# Patient Record
Sex: FEMALE | Race: WHITE | NOT HISPANIC OR LATINO | Employment: FULL TIME | URBAN - METROPOLITAN AREA
[De-identification: names, ages, dates, MRNs, and addresses within clinical notes are randomized per-mention and may not be internally consistent; named-entity substitution may affect disease eponyms.]

---

## 2021-04-09 ENCOUNTER — TRANSFERRED RECORDS (OUTPATIENT)
Dept: HEALTH INFORMATION MANAGEMENT | Facility: CLINIC | Age: 61
End: 2021-04-09

## 2024-06-21 ENCOUNTER — TRANSCRIBE ORDERS (OUTPATIENT)
Dept: OTHER | Age: 64
End: 2024-06-21

## 2024-06-21 DIAGNOSIS — M25.559 CHRONIC HIP PAIN: Primary | ICD-10-CM

## 2024-06-21 DIAGNOSIS — G89.29 CHRONIC HIP PAIN: Primary | ICD-10-CM

## 2024-06-21 DIAGNOSIS — M72.2 PLANTAR FASCIITIS: ICD-10-CM

## 2024-08-06 ENCOUNTER — THERAPY VISIT (OUTPATIENT)
Dept: PHYSICAL THERAPY | Facility: CLINIC | Age: 64
End: 2024-08-06
Payer: COMMERCIAL

## 2024-08-06 DIAGNOSIS — M79.672 LEFT FOOT PAIN: Primary | ICD-10-CM

## 2024-08-06 PROCEDURE — 97110 THERAPEUTIC EXERCISES: CPT | Mod: GP | Performed by: PHYSICAL THERAPIST

## 2024-08-06 PROCEDURE — 97161 PT EVAL LOW COMPLEX 20 MIN: CPT | Mod: GP | Performed by: PHYSICAL THERAPIST

## 2024-08-06 PROCEDURE — 97035 APP MDLTY 1+ULTRASOUND EA 15: CPT | Mod: GP | Performed by: PHYSICAL THERAPIST

## 2024-08-06 PROCEDURE — 97140 MANUAL THERAPY 1/> REGIONS: CPT | Mod: GP | Performed by: PHYSICAL THERAPIST

## 2024-08-06 ASSESSMENT — ACTIVITIES OF DAILY LIVING (ADL)
HOW_WOULD_YOU_RATE_YOUR_CURRENT_LEVEL_OF_FUNCTION_DURING_YOUR_USUAL_ACTIVITIES_OF_DAILY_LIVING_FROM_0_TO_100_WITH_100_BEING_YOUR_LEVEL_OF_FUNCTION_PRIOR_TO_YOUR_HIP_PROBLEM_AND_0_BEING_THE_INABILITY_TO_PERFORM_ANY_OF_YOUR_USUAL_DAILY_ACTIVITIES?: 50
SQUATTING: A LITTLE BIT OF DIFFICULTY
LANDING: MODERATE DIFFICULTY
ABILITY_TO_PERFORM_ACTIVITY_WITH_YOUR_NORMAL_TECHNIQUE: EXTREME DIFFICULTY
SPORTS_SCORE(%): 0
WALKING_A_MILE: MODERATE DIFFICULTY
LEFS_SCORE(%): 0
SPORTS_COUNT: 9
YOUR_USUAL_HOBBIES,_RECREATIONAL_OR_SPORTING_ACTIVITIES: QUITE A BIT OF DIFFICULTY
CUTTING/LATERAL_MOVEMENTS: SLIGHT DIFFICULTY
HOS_ADL_ITEM_SCORE_TOTAL: 2
PERFORMING_LIGHT_ACTIVITIES_AROUND_YOUR_HOME: A LITTLE BIT OF DIFFICULTY
LIFTING_AN_OBJECT,_LIKE_A_BAG_OF_GROCERIES_FROM_THE_FLOOR: NO DIFFICULTY
ADL_TOTAL_ITEM_SCORE: INCOMPLETE
HOS_ADL_HIGHEST_POTENTIAL_SCORE: 4
RUNNING_ON_UNEVEN_GROUND: QUITE A BIT OF DIFFICULTY
SITTING_FOR_1_HOUR: NO DIFFICULTY
RUNNING_ON_EVEN_GROUND: QUITE A BIT OF DIFFICULTY
PLEASE_INDICATE_YOR_PRIMARY_REASON_FOR_REFERRAL_TO_THERAPY:: FOOT AND/OR ANKLE
ADL_HIGHEST_POTENTIAL_SCORE: 4
GETTING_INTO_AND_OUT_OF_A_BATH: NO DIFFICULTY
LOW_IMPACT_ACTIVITIES_LIKE_FAST_WALKING: EXTREME DIFFICULTY
HOW_WOULD_YOU_RATE_YOUR_CURRENT_LEVEL_OF_FUNCTION?: ABNORMAL
WALKING_2_BLOCKS: A LITTLE BIT OF DIFFICULTY
JUMPING: EXTREME DIFFICULTY
ADL_COUNT: 1
PERFORMING_HEAVY_ACTIVITIES_AROUND_YOUR_HOME: MODERATE DIFFICULTY
MAKING_SHARP_TURNS_WHILE_RUNNING_FAST: QUITE A BIT OF DIFFICULTY
WALKING_BETWEEN_ROOMS: A LITTLE BIT OF DIFFICULTY
ROLLING_OVER_IN_BED: NO DIFFICULTY
SWINGING_OBJECTS_LIKE_A_GOLF_CLUB: NO DIFFICULTY AT ALL
PUTTING_ON_YOUR_SHOES_OR_SOCKS: NO DIFFICULTY
STANDING_FOR_15_MINUTES: MODERATE DIFFICULTY
ADL_SCORE(%): INCOMPLETE
SPORTS_TOTAL_ITEM_SCORE: 0
STANDING_FOR_1_HOUR: EXTREME DIFFICULTY OR UNABLE TO PERFORM ACTIVITY
STARTING_AND_STOPPING_QUICKLY: SLIGHT DIFFICULTY
GOING_UP_OR_DOWN_10_STAIRS: MODERATE DIFFICULTY
ANY_OF_YOUR_USUAL_WORK,_HOUSEWORK_OR_SCHOOL_ACTIVITIES: MODERATE DIFFICULTY
PLEASE_INDICATE_YOR_PRIMARY_REASON_FOR_REFERRAL_TO_THERAPY:: HIP
RUNNING_ONE_MILE: UNABLE TO DO
GETTING_INTO_OR_OUT_OF_A_CAR: NO DIFFICULTY
STANDING FOR 15 MINUTES: MODERATE DIFFICULTY
SPORTS_HIGHEST_POTENTIAL_SCORE: 36
HOW_WOULD_YOU_RATE_YOUR_CURRENT_LEVEL_OF_FUNCTION_DURING_YOUR_USUAL_ACTIVITIES_OF_DAILY_LIVING_FROM_0_TO_100_WITH_100_BEING_YOUR_LEVEL_OF_FUNCTION_PRIOR_TO_YOUR_HIP_PROBLEM_AND_0_BEING_THE_INABILITY_TO_PERFORM_ANY_OF_YOUR_USUAL_DAILY_ACTIVITIES?: 50
SHOPPING: QUITE A BIT OF DIFFICULTY
LEFS_RAW_SCORE: 0

## 2024-08-06 NOTE — PROGRESS NOTES
PHYSICAL THERAPY EVALUATION  Type of Visit: Evaluation       Fall Risk Screen:  Fall screen completed by: PT  Have you fallen 2 or more times in the past year?: No  Have you fallen and had an injury in the past year?: No  Is patient a fall risk?: No    Subjective       Presenting condition or subjective complaint: plantar fasciitis and sciatica  Date of onset:      Relevant medical history:     Dates & types of surgery: gall bladder removal    Prior diagnostic imaging/testing results: MRI     Prior therapy history for the same diagnosis, illness or injury:        Prior Level of Function  Transfers: Independent  Ambulation: Independent  ADL: Independent  IADL:     Living Environment  Social support: Alone   Type of home: House   Stairs to enter the home:         Ramp: No   Stairs inside the home: Yes   Is there a railing: Yes     Help at home: None  Equipment owned:       Employment: Yes    Hobbies/Interests:      Patient goals for therapy: walk without pain    Pain assessment: Location: Left foot/Ratin-10/10 PL      Objective   FOOT/ANKLE EVALUATION  PAIN: Pain Level at Rest: 0/10  Pain Level with Use: 10/10  Pain Location: foot   Pain is Exacerbated By: walking; sleeping  Pain is Relieved By: rest  INTEGUMENTARY (edema, incisions):   POSTURE:   GAIT:   Weightbearing Status:   Assistive Device(s):   Gait Deviations:   BALANCE/PROPRIOCEPTION:   WEIGHT BEARING ALIGNMENT:   NON-WEIGHTBEARING ALIGNMENT:    ROM:   (Degrees) Left AROM Left PROM  Right AROM Right PROM   Ankle Dorsiflexion 5  12    Ankle Plantarflexion WNL  WNL    Ankle Inversion WNL  WNL    Ankle Eversion WNL  WNL    Great Toe Flexion       Great Toe Extension       Pain:   End feel:     STRENGTH:   Pain: - none + mild ++ moderate +++ severe  Strength Scale: 0-5/5 Left Right   Ankle Dorsiflexion 4 5   Ankle Plantarflexion 4 5   Ankle Inversion 4 5   Ankle Eversion 4 5   Great Toe Flexion     Great Toe Extension     Anterior Tibialis     Posterior  Tibialis     Peroneals     Extensor Digitorum     Gastroc/Soleus       FLEXIBILITY:   SPECIAL TESTS:   FUNCTIONAL TESTS:   PALPATION:   + Tenderness at Location Left Right   Gastroc/Soleus     Achilles Tendon     Anterior Tibialis     Posterior Tibialis     Incisional     Deltoid Ligament     Plantar Fascia + -   Navicular     Medial Calcaneal     Peroneals     Anterior Talofibular Ligament     Posterior Talofibular Ligament     Calcaneofibular Ligament     Medial Malleolus     Lateral Malleolus     Anterior Tibiofibular Ligament     Posterior Tibiofibular Ligament       JOINT MOBILITY:     Assessment & Plan   CLINICAL IMPRESSIONS  Medical Diagnosis: Left foot    Treatment Diagnosis:     Impression/Assessment: Patient is a 64 year old female with left foot complaints.  The following significant findings have been identified: Pain, Decreased ROM/flexibility, Decreased strength, Impaired muscle performance, and Decreased activity tolerance. These impairments interfere with their ability to perform self care tasks, recreational activities, household chores, household mobility, and community mobility as compared to previous level of function.     Clinical Decision Making (Complexity):  Clinical Presentation: Stable/Uncomplicated  Clinical Presentation Rationale: based on medical and personal factors listed in PT evaluation  Clinical Decision Making (Complexity): Low complexity    PLAN OF CARE  Treatment Interventions:  Modalities: Ultrasound  Interventions: Manual Therapy, Neuromuscular Re-education, Therapeutic Activity, Therapeutic Exercise    Long Term Goals     PT Goal 1  Goal Identifier: Ambulation  Goal Description: Pain free first few step in the morning  Rationale: to maximize safety and independence with performance of ADLs and functional tasks  Goal Progress: 10/10 PL with first few steps in mornins  Target Date: 10/01/24      Frequency of Treatment: 1x/week  Duration of Treatment: 8 weeks    Recommended  Referrals to Other Professionals:   Education Assessment:        Risks and benefits of evaluation/treatment have been explained.   Patient/Family/caregiver agrees with Plan of Care.     Evaluation Time:     PT Sophie, Low Complexity Minutes (32287): 15       Signing Clinician: Fiordaliza Calzada PT

## 2024-08-13 ENCOUNTER — THERAPY VISIT (OUTPATIENT)
Dept: PHYSICAL THERAPY | Facility: CLINIC | Age: 64
End: 2024-08-13
Payer: COMMERCIAL

## 2024-08-13 DIAGNOSIS — M79.672 LEFT FOOT PAIN: Primary | ICD-10-CM

## 2024-08-13 PROCEDURE — 97140 MANUAL THERAPY 1/> REGIONS: CPT | Mod: GP | Performed by: PHYSICAL THERAPIST

## 2024-08-13 PROCEDURE — 97035 APP MDLTY 1+ULTRASOUND EA 15: CPT | Mod: GP | Performed by: PHYSICAL THERAPIST

## 2024-08-13 PROCEDURE — 97110 THERAPEUTIC EXERCISES: CPT | Mod: GP | Performed by: PHYSICAL THERAPIST

## 2024-08-20 ENCOUNTER — THERAPY VISIT (OUTPATIENT)
Dept: PHYSICAL THERAPY | Facility: CLINIC | Age: 64
End: 2024-08-20
Payer: COMMERCIAL

## 2024-08-20 DIAGNOSIS — M25.551 HIP PAIN, RIGHT: Primary | ICD-10-CM

## 2024-08-20 PROCEDURE — 97140 MANUAL THERAPY 1/> REGIONS: CPT | Mod: GP | Performed by: PHYSICAL THERAPIST

## 2024-08-20 PROCEDURE — 97110 THERAPEUTIC EXERCISES: CPT | Mod: GP | Performed by: PHYSICAL THERAPIST

## 2024-08-20 PROCEDURE — 97161 PT EVAL LOW COMPLEX 20 MIN: CPT | Mod: GP | Performed by: PHYSICAL THERAPIST

## 2024-08-20 ASSESSMENT — ACTIVITIES OF DAILY LIVING (ADL)
ABILITY_TO_PARTICIPATE_IN_YOUR_DESIRED_SPORT_AS_LONG_AS_YOU_WOULD_LIKE: EXTREME DIFFICULTY
HEAVY_WORK: MODERATE DIFFICULTY
GETTING INTO AND OUT OF AN AVERAGE CAR: NO DIFFICULTY AT ALL
RUNNING_ON_EVEN_GROUND: QUITE A BIT OF DIFFICULTY
ADL_SCORE(%): 0
HOS_ADL_SCORE(%): 56.25
PUTTING ON SOCKS AND SHOES: SLIGHT DIFFICULTY
WALKING_15_MINUTES_OR_GREATER: EXTREME DIFFICULTY
PLEASE_INDICATE_YOR_PRIMARY_REASON_FOR_REFERRAL_TO_THERAPY:: HIP
LOW_IMPACT_ACTIVITIES_LIKE_FAST_WALKING: EXTREME DIFFICULTY
MAKING_SHARP_TURNS_WHILE_RUNNING_FAST: QUITE A BIT OF DIFFICULTY
WALKING_INITIALLY: SLIGHT DIFFICULTY
ANY_OF_YOUR_USUAL_WORK,_HOUSEWORK_OR_SCHOOL_ACTIVITIES: QUITE A BIT OF DIFFICULTY
ROLLING_OVER_IN_BED: NO DIFFICULTY AT ALL
HOW_WOULD_YOU_RATE_YOUR_CURRENT_LEVEL_OF_FUNCTION_DURING_YOUR_USUAL_ACTIVITIES_OF_DAILY_LIVING_FROM_0_TO_100_WITH_100_BEING_YOUR_LEVEL_OF_FUNCTION_PRIOR_TO_YOUR_HIP_PROBLEM_AND_0_BEING_THE_INABILITY_TO_PERFORM_ANY_OF_YOUR_USUAL_DAILY_ACTIVITIES?: 30
RECREATIONAL_ACTIVITIES: MODERATE DIFFICULTY
ABILITY_TO_PERFORM_ACTIVITY_WITH_YOUR_NORMAL_TECHNIQUE: EXTREME DIFFICULTY
GOING_UP_1_FLIGHT_OF_STAIRS: SLIGHT DIFFICULTY
SHOPPING: EXTREME DIFFICULTY OR UNABLE TO PERFORM ACTIVITY
STANDING_FOR_15_MINUTES: EXTREME DIFFICULTY
GOING_UP_OR_DOWN_10_STAIRS: QUITE A BIT OF DIFFICULTY
CUTTING/LATERAL_MOVEMENTS: MODERATE DIFFICULTY
LIFTING_AN_OBJECT,_LIKE_A_BAG_OF_GROCERIES_FROM_THE_FLOOR: A LITTLE BIT OF DIFFICULTY
HEAVY_WORK: MODERATE DIFFICULTY
SQUATTING: QUITE A BIT OF DIFFICULTY
HOW_WOULD_YOU_RATE_YOUR_CURRENT_LEVEL_OF_FUNCTION_DURING_YOUR_USUAL_ACTIVITIES_OF_DAILY_LIVING_FROM_0_TO_100_WITH_100_BEING_YOUR_LEVEL_OF_FUNCTION_PRIOR_TO_YOUR_HIP_PROBLEM_AND_0_BEING_THE_INABILITY_TO_PERFORM_ANY_OF_YOUR_USUAL_DAILY_ACTIVITIES?: 30
RUNNING_ON_UNEVEN_GROUND: QUITE A BIT OF DIFFICULTY
WALKING_BETWEEN_ROOMS: A LITTLE BIT OF DIFFICULTY
YOUR_USUAL_HOBBIES,_RECREATIONAL_OR_SPORTING_ACTIVITIES: QUITE A BIT OF DIFFICULTY
LEFS_RAW_SCORE: INCOMPLETE
PUTTING_ON_YOUR_SHOES_OR_SOCKS: A LITTLE BIT OF DIFFICULTY
WALKING_UP_STEEP_HILLS: EXTREME DIFFICULTY
ADL_TOTAL_ITEM_SCORE: 0
WALKING_APPROXIMATELY_10_MINUTES: MODERATE DIFFICULTY
SPORTS_TOTAL_ITEM_SCORE: 0
HOW_WOULD_YOU_RATE_YOUR_CURRENT_LEVEL_OF_FUNCTION?: ABNORMAL
LEFS_SCORE(%): INCOMPLETE
RUNNING_ONE_MILE: UNABLE TO DO
JUMPING: EXTREME DIFFICULTY
RECREATIONAL ACTIVITIES: MODERATE DIFFICULTY
SITTING_FOR_15_MINUTES: NO DIFFICULTY AT ALL
WALKING_DOWN_STEEP_HILLS: MODERATE DIFFICULTY
SPORTS_HIGHEST_POTENTIAL_SCORE: 36
LIGHT_TO_MODERATE_WORK: EXTREME DIFFICULTY
WALKING_2_BLOCKS: MODERATE DIFFICULTY
HOS_ADL_HIGHEST_POTENTIAL_SCORE: 64
ADL_COUNT: 17
PUTTING_ON_SOCKS_AND_SHOES: SLIGHT DIFFICULTY
ADL_HIGHEST_POTENTIAL_SCORE: 68
STEPPING UP AND DOWN CURBS: SLIGHT DIFFICULTY
STANDING FOR 15 MINUTES: EXTREME DIFFICULTY
STARTING_AND_STOPPING_QUICKLY: MODERATE DIFFICULTY
SPORTS_COUNT: 9
SWINGING_OBJECTS_LIKE_A_GOLF_CLUB: SLIGHT DIFFICULTY
DEEP SQUATTING: EXTREME DIFFICULTY
GOING_DOWN_1_FLIGHT_OF_STAIRS: NO DIFFICULTY AT ALL
GETTING_INTO_AND_OUT_OF_AN_AVERAGE_CAR: NO DIFFICULTY AT ALL
TWISTING/PIVOTING_ON_INVOLVED_LEG: MODERATE DIFFICULTY
WALKING_15_MINUTES_OR_GREATER: EXTREME DIFFICULTY
ROLLING_OVER_IN_BED: NO DIFFICULTY
WALKING_UP_STEEP_HILLS: EXTREME DIFFICULTY
SITTING FOR 15 MINUTES: NO DIFFICULTY AT ALL
GETTING_INTO_OR_OUT_OF_A_CAR: A LITTLE BIT OF DIFFICULTY
STEPPING_UP_AND_DOWN_CURBS: SLIGHT DIFFICULTY
WALKING_DOWN_STEEP_HILLS: MODERATE DIFFICULTY
WALKING_FOR_APPROXIMATELY_10_MINUTES: MODERATE DIFFICULTY
WALKING_INITIALLY: SLIGHT DIFFICULTY
GOING DOWN 1 FLIGHT OF STAIRS: NO DIFFICULTY AT ALL
LIGHT_TO_MODERATE_WORK: EXTREME DIFFICULTY
STANDING_FOR_1_HOUR: EXTREME DIFFICULTY OR UNABLE TO PERFORM ACTIVITY
PLEASE_INDICATE_YOR_PRIMARY_REASON_FOR_REFERRAL_TO_THERAPY:: FOOT AND/OR ANKLE
HOS_ADL_ITEM_SCORE_TOTAL: 36
DEEP_SQUATTING: EXTREME DIFFICULTY
LANDING: EXTREME DIFFICULTY
ROLLING OVER IN BED: NO DIFFICULTY AT ALL
TWISTING/PIVOTING ON INVOLVED LEG: MODERATE DIFFICULTY
SITTING_FOR_1_HOUR: NO DIFFICULTY
PERFORMING_HEAVY_ACTIVITIES_AROUND_YOUR_HOME: QUITE A BIT OF DIFFICULTY
GOING UP 1 FLIGHT OF STAIRS: SLIGHT DIFFICULTY
PERFORMING_LIGHT_ACTIVITIES_AROUND_YOUR_HOME: A LITTLE BIT OF DIFFICULTY
WALKING_A_MILE: QUITE A BIT OF DIFFICULTY
SPORTS_SCORE(%): 0

## 2024-08-20 NOTE — PROGRESS NOTES
PHYSICAL THERAPY EVALUATION  Type of Visit: Evaluation        Fall Risk Screen:  Fall screen completed by: PT  Have you fallen 2 or more times in the past year?: No  Have you fallen and had an injury in the past year?: No  Is patient a fall risk?: No    Subjective       Presenting condition or subjective complaint: plantar fasciitis and sciatica  Date of onset:      Relevant medical history:     Dates & types of surgery: gall bladder removal    Prior diagnostic imaging/testing results: MRI     Prior therapy history for the same diagnosis, illness or injury:        Prior Level of Function  Transfers: Independent  Ambulation: Independent  ADL: Independent  IADL:     Living Environment  Social support: Alone   Type of home: House   Stairs to enter the home:         Ramp: No   Stairs inside the home: Yes   Is there a railing: Yes     Help at home: None  Equipment owned:       Employment: Yes    Hobbies/Interests:      Patient goals for therapy: walk without pain    Pain assessment: Pain present  Location: Right hip; groin/lateral hip/right glute/Ratin-10/10 PL     Objective   HIP EVALUATION  PAIN: Pain Level at Rest: 2/10  Pain Level with Use: 10/10  Pain is Exacerbated By: Walking  Pain is Relieved By: NSAIDs, rest, and sitting  INTEGUMENTARY (edema, incisions):   POSTURE:   GAIT:   Weightbearing Status: WBAT  Assistive Device(s): None  Gait Deviations: Base of support decreased  Stance time decreased  Stride length decreased  BALANCE/PROPRIOCEPTION:   WEIGHTBEARING ALIGNMENT:   NON-WEIGHTBEARING ALIGNMENT:    ROM:   (Degrees) Left AROM Left PROM  Right AROM Right PROM   Hip Flexion  110  90 with pain at end-range   Hip Extension       Hip Abduction  45  30   Hip Adduction       Hip Internal Rotation  20  -10   Hip External Rotation  40  20   Knee Flexion       Knee Extension       Lumbar Side glide     Lumbar Flexion    Lumbar Extension    Pain:   End feel:     PELVIC/SI SCREEN:   STRENGTH:   Pain: - none + mild  ++ moderate +++ severe  Strength Scale: 0-5/5 Left Right   Hip Flexion     Hip Extension 5 4-   Hip Abduction 5 4-   Hip Adduction     Hip Internal Rotation     Hip External Rotation     Knee Flexion     Knee Extension       LE FLEXIBILITY:   SPECIAL TESTS:   FUNCTIONAL TESTS:   PALPATION:   JOINT MOBILITY:     Assessment & Plan   CLINICAL IMPRESSIONS  Medical Diagnosis: R hip    Treatment Diagnosis:     Impression/Assessment: Patient is a 64 year old female with Right hip complaints.  The following significant findings have been identified: Pain, Decreased ROM/flexibility, Decreased joint mobility, Decreased strength, Impaired gait, and Impaired muscle performance. These impairments interfere with their ability to perform recreational activities, household chores, household mobility, and community mobility as compared to previous level of function.     Clinical Decision Making (Complexity):  Clinical Presentation: Stable/Uncomplicated  Clinical Presentation Rationale: based on medical and personal factors listed in PT evaluation  Clinical Decision Making (Complexity): Low complexity    PLAN OF CARE  Treatment Interventions:  Interventions: Manual Therapy, Neuromuscular Re-education, Therapeutic Activity, Therapeutic Exercise    Long Term Goals     PT Goal 1  Goal Identifier: Ambulation  Goal Description: Able to walk 20 min pain free  Rationale: to maximize safety and independence with performance of ADLs and functional tasks  Goal Progress: Able to walk 20' 2-10/10 PL  Target Date: 10/15/24      Frequency of Treatment: 1x/week  Duration of Treatment: 8 weeks    Recommended Referrals to Other Professionals:   Education Assessment:        Risks and benefits of evaluation/treatment have been explained.   Patient/Family/caregiver agrees with Plan of Care.     Evaluation Time:     PT Eval, Low Complexity Minutes (30934): 15       Signing Clinician: Fiordaliza Calzada PT

## 2024-08-27 ENCOUNTER — THERAPY VISIT (OUTPATIENT)
Dept: PHYSICAL THERAPY | Facility: CLINIC | Age: 64
End: 2024-08-27
Payer: COMMERCIAL

## 2024-08-27 DIAGNOSIS — M79.672 LEFT FOOT PAIN: Primary | ICD-10-CM

## 2024-08-27 PROCEDURE — 97140 MANUAL THERAPY 1/> REGIONS: CPT | Mod: GP | Performed by: PHYSICAL THERAPY ASSISTANT

## 2024-08-27 PROCEDURE — 97035 APP MDLTY 1+ULTRASOUND EA 15: CPT | Mod: GP | Performed by: PHYSICAL THERAPY ASSISTANT

## 2024-08-27 PROCEDURE — 97110 THERAPEUTIC EXERCISES: CPT | Mod: GP | Performed by: PHYSICAL THERAPY ASSISTANT

## 2024-08-29 DIAGNOSIS — M25.551 RIGHT HIP PAIN: Primary | ICD-10-CM

## 2024-08-29 NOTE — PROGRESS NOTES
[facility]  SPORTS MEDICINE CLINIC VISIT     Sep 4, 2024        ASSESSMENT & PLAN    64-year-old with right hip pain in the groin and lateral hip that is suspected to be due to advanced osteoarthritis in the femoral acetabular joint    Reviewed imaging and assessment with patient in detail  We discussed option for treatment which could include injection for diagnostic and therapeutic purposes as well as referral to surgeon for consultation regarding arthroplasty.  After discussion the patient would like to pursue injection first.  She will be assisted with scheduling.  Okay to continue with physical therapy at her discretion    Richard Burk MD  [facility] SPORTS MEDICINE [facility]    -----  Chief Complaint   Patient presents with    Consult For     Right hip pain       SUBJECTIVE  Jaky Brooke is a/an 64 year old female who is seen as a self referral for evaluation of  Right hip pain.     The patient is seen by themselves.  The patient is Right handed    Onset: many years(s) ago. Reports insidious onset without acute precipitating event. Possible over streching in yoga  Location of Pain: right hip - lateral, groin, and posterior  Worsened by: walking, standing  Better with: ibuprofen  Treatments tried:  PT (4 visits), chiropractic, ibuprofen  Associated symptoms: feeling of instability, pain going down the leg, denies numbness or tingling     Orthopedic/Surgical history: NO  Social History/Occupation: Sit at a desk      REVIEW OF SYSTEMS:  Do you have fever, chills, weight loss? No  Do you have any vision problems? No  Do you have any chest pain or edema? No  Do you have any shortness of breath or wheezing?  No  Do you have stomach problems? No  Do you have any numbness or focal weakness? No  Do you have diabetes? No  Do you have problems with bleeding or clotting? No  Do you have an rashes or other skin lesions? No    OBJECTIVE:  There were no vitals taken for this visit.      Exam:  Patient is alert, in no acute distress, pleasant and conversational.    Gait: Nonantalgic.  Normal heel toe gait      right Hip:  Supine PROM:  Flexion: Approximately 115 , no tenderness.  External rotation: approximately 60 , no tenderness.  Internal rotation: Approximately 0 , with pain    Strength Testing:  Not tested    Palpation:  negative tenderness to palpation over the greater trochanter.  negative tenderness to palpation over psoas  negative tenderness to palpation over ASIS  negative tenderness to palpation over Iliac crest  negative tenderness to palpation along the piriformis.  negative tenderness to palpation of the SI joint    Special Tests:  negative Tyrone's test.   negative KALYAN's test  positive  FADIR's test  positive  Scour test  negative Reported pain with resisted hip flexion to opposite shoulder     Neurovascularly intact in bilateral lower extremities      RADIOLOGY:    2 view xrays of right hip performed and reviewed independently demonstrating moderate to severe osteoarthritis.  No acute findings. See EMR for formal radiology report.

## 2024-09-03 NOTE — TELEPHONE ENCOUNTER
REASON FOR VISIT: right hip    DATE OF APPT: 9/04/2024   NOTES (FOR ALL VISITS) STATUS DETAILS   OFFICE NOTE from referring provider N/A    OFFICE NOTE from other specialist Internal [facility]  Fiordaliza Calzada, PT 8/20/2024   MEDICATION LIST N/A    IMAGING  (FOR ALL VISITS)     XR Scheduled [facility]  XR Pelvis and hip 9/04/2024   MRI (HEAD, NECK, SPINE) N/A    CT (HEAD, NECK, SPINE) N/A

## 2024-09-04 ENCOUNTER — PRE VISIT (OUTPATIENT)
Dept: ORTHOPEDICS | Facility: CLINIC | Age: 64
End: 2024-09-04

## 2024-09-04 ENCOUNTER — OFFICE VISIT (OUTPATIENT)
Dept: ORTHOPEDICS | Facility: CLINIC | Age: 64
End: 2024-09-04
Payer: COMMERCIAL

## 2024-09-04 ENCOUNTER — THERAPY VISIT (OUTPATIENT)
Dept: PHYSICAL THERAPY | Facility: CLINIC | Age: 64
End: 2024-09-04
Payer: COMMERCIAL

## 2024-09-04 ENCOUNTER — ANCILLARY PROCEDURE (OUTPATIENT)
Dept: GENERAL RADIOLOGY | Facility: CLINIC | Age: 64
End: 2024-09-04
Attending: FAMILY MEDICINE
Payer: COMMERCIAL

## 2024-09-04 DIAGNOSIS — M25.551 HIP PAIN, RIGHT: Primary | ICD-10-CM

## 2024-09-04 DIAGNOSIS — M25.551 RIGHT HIP PAIN: ICD-10-CM

## 2024-09-04 DIAGNOSIS — M16.11 PRIMARY OSTEOARTHRITIS OF RIGHT HIP: Primary | ICD-10-CM

## 2024-09-04 PROCEDURE — 97110 THERAPEUTIC EXERCISES: CPT | Mod: GP | Performed by: PHYSICAL THERAPY ASSISTANT

## 2024-09-04 PROCEDURE — 73502 X-RAY EXAM HIP UNI 2-3 VIEWS: CPT | Mod: RT | Performed by: RADIOLOGY

## 2024-09-04 PROCEDURE — 99204 OFFICE O/P NEW MOD 45 MIN: CPT | Performed by: FAMILY MEDICINE

## 2024-09-04 PROCEDURE — 97140 MANUAL THERAPY 1/> REGIONS: CPT | Mod: GP | Performed by: PHYSICAL THERAPY ASSISTANT

## 2024-09-04 NOTE — LETTER
9/4/2024      Jaky Brooke  [address]  [place] [address]      Dear Colleague,    Thank you for referring your patient, Jaky Brooke, to the [facility]. Please see a copy of my visit note below.      [facility]  SPORTS MEDICINE CLINIC VISIT     Sep 4, 2024        ASSESSMENT & PLAN    64-year-old with right hip pain in the groin and lateral hip that is suspected to be due to advanced osteoarthritis in the femoral acetabular joint    Reviewed imaging and assessment with patient in detail  We discussed option for treatment which could include injection for diagnostic and therapeutic purposes as well as referral to surgeon for consultation regarding arthroplasty.  After discussion the patient would like to pursue injection first.  She will be assisted with scheduling.  Okay to continue with physical therapy at her discretion    Richard Burk MD  [facility]    -----  Chief Complaint   Patient presents with     Consult For     Right hip pain       SUBJECTIVE  Jaky Brooke is a/an 64 year old female who is seen as a self referral for evaluation of  Right hip pain.     The patient is seen by themselves.  The patient is Right handed    Onset: many years(s) ago. Reports insidious onset without acute precipitating event. Possible over streching in yoga  Location of Pain: right hip - lateral, groin, and posterior  Worsened by: walking, standing  Better with: ibuprofen  Treatments tried:  PT (4 visits), chiropractic, ibuprofen  Associated symptoms: feeling of instability, pain going down the leg, denies numbness or tingling     Orthopedic/Surgical history: NO  Social History/Occupation: Sit at a desk      REVIEW OF SYSTEMS:  Do you have fever, chills, weight loss? No  Do you have any vision problems? No  Do you have any chest pain or edema? No  Do you have any shortness of breath or wheezing?  No  Do you have  stomach problems? No  Do you have any numbness or focal weakness? No  Do you have diabetes? No  Do you have problems with bleeding or clotting? No  Do you have an rashes or other skin lesions? No    OBJECTIVE:  There were no vitals taken for this visit.     Exam:  Patient is alert, in no acute distress, pleasant and conversational.    Gait: Nonantalgic.  Normal heel toe gait      right Hip:  Supine PROM:  Flexion: Approximately 115 , no tenderness.  External rotation: approximately 60 , no tenderness.  Internal rotation: Approximately 0 , with pain    Strength Testing:  Not tested    Palpation:  negative tenderness to palpation over the greater trochanter.  negative tenderness to palpation over psoas  negative tenderness to palpation over ASIS  negative tenderness to palpation over Iliac crest  negative tenderness to palpation along the piriformis.  negative tenderness to palpation of the SI joint    Special Tests:  negative Tyrone's test.   negative KALYAN's test  positive  FADIR's test  positive  Scour test  negative Reported pain with resisted hip flexion to opposite shoulder     Neurovascularly intact in bilateral lower extremities      RADIOLOGY:    2 view xrays of right hip performed and reviewed independently demonstrating moderate to severe osteoarthritis.  No acute findings. See EMR for formal radiology report.           Again, thank you for allowing me to participate in the care of your patient.        Sincerely,        Richard Burk MD

## 2024-09-14 ENCOUNTER — OFFICE VISIT (OUTPATIENT)
Dept: ORTHOPEDICS | Facility: CLINIC | Age: 64
End: 2024-09-14
Payer: COMMERCIAL

## 2024-09-14 DIAGNOSIS — M25.551 RIGHT HIP PAIN: Primary | ICD-10-CM

## 2024-09-14 PROCEDURE — 20611 DRAIN/INJ JOINT/BURSA W/US: CPT | Mod: RT | Performed by: FAMILY MEDICINE

## 2024-09-14 RX ORDER — TRIAMCINOLONE ACETONIDE 40 MG/ML
40 INJECTION, SUSPENSION INTRA-ARTICULAR; INTRAMUSCULAR
Status: SHIPPED | OUTPATIENT
Start: 2024-09-14

## 2024-09-14 RX ADMIN — TRIAMCINOLONE ACETONIDE 40 MG: 40 INJECTION, SUSPENSION INTRA-ARTICULAR; INTRAMUSCULAR at 11:16

## 2024-09-14 NOTE — PROGRESS NOTES
Ultrasound-guided right hip injection    Date/Time: 9/14/2024 11:16 AM    Performed by: Richard Burk MD  Authorized by: Richard Burk MD    Indications:  Osteoarthritis and pain  Needle Size:  22 G  Guidance: ultrasound    Approach:  Anterior  Location:  Hip      Site:  R hip joint  Medications:  40 mg triamcinolone 40 MG/ML  Outcome:  Tolerated well, no immediate complications  Procedure discussed: discussed risks, benefits, and alternatives    Consent Given by:  Patient  Timeout: timeout called immediately prior to procedure    Prep: patient was prepped and draped in usual sterile fashion     Patient was positioned lying on exam table. Utilizing ultrasound, the femoral head neck junction was visualized and marked with a pen. Ultrasound was utilized to ensure safety of injection clear of neurovascular structures. Next the area was cleaned with a chlorhexadine swab. Using sterile technique, and continuous ultrasound visualization, 3mL 1% lidocaine was injected through a 25g needle along the injection tract. Next, a 22g spinal needle was introduced into the joint at the level of the femoral head/neck junction under direct and continuous ultrasound guidance. A solution of 2mL 1% lidocaine and 40mg triamcinolone was injected and seen flowing through the joint. Images were captured and saved to the patient's permanent record. Patient tolerated the procedure well. No immediate complications. Routine postinjection instructions were given. Follow up promptly if significant increase in pain, warmth, redness from the injection site.     Richard Burk MD

## 2024-09-14 NOTE — LETTER
9/14/2024      Jaky Brooke  [address]  [place] [address]      Dear Colleague,    Thank you for referring your patient, Jaky Brooke, to the [facility] SPORTS MEDICINE CLINIC [place]. Please see a copy of my visit note below.    Ultrasound-guided right hip injection    Date/Time: 9/14/2024 11:16 AM    Performed by: Richard Burk MD  Authorized by: Richard Burk MD    Indications:  Osteoarthritis and pain  Needle Size:  22 G  Guidance: ultrasound    Approach:  Anterior  Location:  Hip      Site:  R hip joint  Medications:  40 mg triamcinolone 40 MG/ML  Outcome:  Tolerated well, no immediate complications  Procedure discussed: discussed risks, benefits, and alternatives    Consent Given by:  Patient  Timeout: timeout called immediately prior to procedure    Prep: patient was prepped and draped in usual sterile fashion     Patient was positioned lying on exam table. Utilizing ultrasound, the femoral head neck junction was visualized and marked with a pen. Ultrasound was utilized to ensure safety of injection clear of neurovascular structures. Next the area was cleaned with a chlorhexadine swab. Using sterile technique, and continuous ultrasound visualization, 3mL 1% lidocaine was injected through a 25g needle along the injection tract. Next, a 22g spinal needle was introduced into the joint at the level of the femoral head/neck junction under direct and continuous ultrasound guidance. A solution of 2mL 1% lidocaine and 40mg triamcinolone was injected and seen flowing through the joint. Images were captured and saved to the patient's permanent record. Patient tolerated the procedure well. No immediate complications. Routine postinjection instructions were given. Follow up promptly if significant increase in pain, warmth, redness from the injection site.     Richard Burk MD               Again, thank you for allowing me to participate in the care of your patient.         Sincerely,        Richard Burk MD

## 2024-09-14 NOTE — NURSING NOTE
[facility]   ORTHOPEDICS & SPORTS MEDICINE  [address] [place] [address]  Dept: [phone]  ______________________________________________________________________________    Patient: Jaky Brooke   : 1960   MRN: [id number]   2024    INVASIVE PROCEDURE SAFETY CHECKLIST    Date: 24   Procedure:Right Hip Injection  Patient Name: Jaky Brooke  MRN: [id number]  YOB: 1960    Action: Complete sections as appropriate. Any discrepancy results in a HARD COPY until resolved.     PRE PROCEDURE:  Patient ID verified with 2 identifiers (name and  or MRN): Yes  Procedure and site verified with patient/designee (when able): Yes  Accurate consent documentation in medical record: Yes  H&P (or appropriate assessment) documented in medical record: NA  H&P must be up to 20 days prior to procedure and updates within 24 hours of procedure as applicable: NA  Relevant diagnostic and radiology test results appropriately labeled and displayed as applicable: Yes  Procedure site(s) marked with provider initials: Yes    TIMEOUT:  Time-Out performed immediately prior to starting procedure, including verbal and active participation of all team members addressing the following:Yes  * Correct patient identify  * Confirmed that the correct side and site are marked  * An accurate procedure consent form  * Agreement on the procedure to be done  * Correct patient position  * Relevant images and results are properly labeled and appropriately displayed  * The need to administer antibiotics or fluids for irrigation purposes during the procedure as applicable   * Safety precautions based on patient history or medication use    DURING PROCEDURE: Verification of correct person, site, and procedures any time the responsibility for care of the patient is transferred to another member of the care team.       Prior to injection, verified patient identity using patient's name and date of  birth.  Due to injection administration, patient instructed to remain in clinic for 15 minutes  afterwards, and to report any adverse reaction to me immediately.    Right Hip Injection    Drug Amount Wasted:  None.  Vial/Syringe: Single dose vial  Expiration Date:  05/31/2026      Ailyn Zheng LPN  September 14, 2024

## 2024-09-17 ENCOUNTER — THERAPY VISIT (OUTPATIENT)
Dept: PHYSICAL THERAPY | Facility: CLINIC | Age: 64
End: 2024-09-17
Payer: COMMERCIAL

## 2024-09-17 DIAGNOSIS — M25.551 HIP PAIN, RIGHT: Primary | ICD-10-CM

## 2024-09-17 DIAGNOSIS — M79.672 LEFT FOOT PAIN: ICD-10-CM

## 2024-09-17 PROCEDURE — 97110 THERAPEUTIC EXERCISES: CPT | Mod: GP | Performed by: PHYSICAL THERAPIST

## 2024-09-18 NOTE — PROGRESS NOTES
09/17/24 0500   Appointment Info   Signing clinician's name / credentials Fiordaliza Calzada DPT   Total/Authorized Visits 8   Visits Used 2   Medical Diagnosis R hip   PT Tx Diagnosis Left foot   Progress Note/Certification   Therapy Frequency 1x/week   Predicted Duration 8 weeks   PT Goal 1   Goal Identifier Ambulation   Goal Description Able to walk 20 min pain free   Rationale to maximize safety and independence with performance of ADLs and functional tasks   Goal Progress Able to walk 20' 2-10/10 PL   Target Date 10/15/24   Subjective Report   Subjective Report Patient reports the injection has helped greatly with her pain.  Due to her imaging results plans to have a ortho consult to discuss surgery.  We discussed to continue with a prehab HEP 3x/week.   Objective Measures   Objective Measures Objective Measure 1;Objective Measure 2   Objective Measure 1   Objective Measure R Hip PROM   Details Flex 90 + Abd 30+ IR -10+ ER 20 +   Objective Measure 2   Objective Measure PROM   Details DF 10   PT Modalities   PT Modalities Ultrasound   Ultrasound   Ultrasound -Type (does not include 3-5 min prep/cleanup time) Continuous   Intensity 1.8   Duration (does not include the 3-5 min set up/clean up time) 8 min   Frequency 1 MHz   Location left arch/heel   Positioning prone   Patient Response/Progress tolerated well   Treatment Interventions (PT)   Interventions Therapeutic Procedure/Exercise;Manual Therapy   Therapeutic Procedure/Exercise   Therapeutic Procedures: strength, endurance, ROM, flexibility minutes (11694) 30   PTRx Ther Proc 1 Prone Positioning   PTRx Ther Proc 1 - Details VR; has worked to LATHA    PTRx Ther Proc 2 All 4s Cat Cow   PTRx Ther Proc 2 - Details No Notes   PTRx Ther Proc 3 Repeated Hip Extension in Neutral/IR/ER   PTRx Ther Proc 3 - Details No Notes   PTRx Ther Proc 4 Standing Hip Abduction   PTRx Ther Proc 4 - Details 5x Red loop   PTRx Ther Proc 5 Hip AROM Standing Extension   PTRx Ther Proc 5  - Details x10 B red loop   PTRx Ther Proc 6 Hip Flexion With Tubing   PTRx Ther Proc 6 - Details 5x red loop   PTRx Ther Proc 7 Bridging #1   PTRx Ther Proc 7 - Details x10    Skilled Intervention progressed to hip strength to support hip and improve ambulation   Patient Response/Progress pain with clamshell but no worse after;   PTRx Ther Proc 8 Clamshell with Theraband   PTRx Ther Proc 8 - Details 10x RTB   PTRx Ther Proc 9 Knee Bends   PTRx Ther Proc 9 - Details 10x   PTRx Ther Proc 10 Seated Hamstring Curl with Tubing   PTRx Ther Proc 10 - Details 10x RTB   Manual Therapy   Manual Therapy 1 Efflaurage with mobilization   Manual Therapy 1 - Details Anterior/posterior hip (supine/prone) 2 sets 10 reps Post Grade II mobs hip/knee 90/90   Skilled Intervention for ROM and pain control   Patient Response/Progress increased motion; decreased pain   Plan   Home program PTRX print   Updates to plan of care added strength   Plan for next session await recommendations from MD; progress strength as tolerated   Total Session Time   Timed Code Treatment Minutes 30   Total Treatment Time (sum of timed and untimed services) 30         DISCHARGE  Reason for Discharge: Improved with injection    Equipment Issued:     Discharge Plan: Patient to continue home program.    Referring Provider:  Robby Nava

## 2024-09-27 ENCOUNTER — PATIENT OUTREACH (OUTPATIENT)
Dept: CARE COORDINATION | Facility: CLINIC | Age: 64
End: 2024-09-27
Payer: COMMERCIAL

## 2024-09-27 NOTE — TELEPHONE ENCOUNTER
REASON FOR VISIT: Primary osteoarthritis of right hip   DATE OF APPT: 10/15/2024   NOTES (FOR ALL VISITS) STATUS DETAILS   OFFICE NOTE from referring provider [facility] Richard Hernandez MD 9/26/2024   OFFICE NOTE from other specialist [facility]  Josee Marie, JACOBO 9/04/2024   MEDICATION LIST N/A    IMAGING  (FOR ALL VISITS)     XR [facility]  XR Pelvis/hip right 9/04/2024   MRI (HEAD, NECK, SPINE) N/A    CT (HEAD, NECK, SPINE) N/A        
no

## 2024-09-30 ENCOUNTER — PATIENT OUTREACH (OUTPATIENT)
Dept: CARE COORDINATION | Facility: CLINIC | Age: 64
End: 2024-09-30
Payer: COMMERCIAL

## 2024-10-15 ENCOUNTER — OFFICE VISIT (OUTPATIENT)
Dept: ORTHOPEDICS | Facility: CLINIC | Age: 64
End: 2024-10-15
Payer: COMMERCIAL

## 2024-10-15 ENCOUNTER — PRE VISIT (OUTPATIENT)
Dept: ORTHOPEDICS | Facility: CLINIC | Age: 64
End: 2024-10-15

## 2024-10-15 ENCOUNTER — TELEPHONE (OUTPATIENT)
Dept: ORTHOPEDICS | Facility: CLINIC | Age: 64
End: 2024-10-15

## 2024-10-15 VITALS — WEIGHT: 240 LBS | BODY MASS INDEX: 38.57 KG/M2 | HEIGHT: 66 IN

## 2024-10-15 DIAGNOSIS — M16.11 PRIMARY LOCALIZED OSTEOARTHRITIS OF RIGHT HIP: Primary | ICD-10-CM

## 2024-10-15 PROCEDURE — 99204 OFFICE O/P NEW MOD 45 MIN: CPT | Mod: GC | Performed by: ORTHOPAEDIC SURGERY

## 2024-10-15 ASSESSMENT — HOOS JR
HOOS JR TOTAL INTERVAL SCORE: 70.43
RISING FROM SITTING: MILD
SITTING: MILD
WALKING ON UNEVEN SURFACE: MILD
GOING UP OR DOWN STAIRS: MODERATE
LYING IN BED (TURNING OVER, MAINTAINING HIP POSITION): MILD

## 2024-10-15 ASSESSMENT — PAIN SCALES - GENERAL: PAINLEVEL: MODERATE PAIN (5)

## 2024-10-15 NOTE — LETTER
10/15/2024      Jaky Brooke  [address]  [place] [place] [address]      Dear Colleague,    Thank you for referring your patient, Jaky Brooke, to the [facility]. Please see a copy of my visit note below.    Assessment: Right hip OA, currently being managed with conservative measures such as PT, IA CSI, NSAIDs.    Plan: Right MICHAEL      Chief Complaint: Consult (R hip consult )      Physician:  Referred Self    HPI: Jaky Brooke is a 64 year old female who presents today for evaluation of chronic right hip pain.  States that started about 3 years ago without any precipitating event.  Feels mostly laterally but does have some groin and some lower back pain.  Does have some pain that radiates down past the knee on the right side.  Has done physical therapy, has been to the chiropractor, takes ibuprofen daily, had an intra-articular right hip injection 1 month ago all of these with some relief. Symptoms are limiting her daily activities. She is interested in surgical management if this is recommended.     Location of symptoms:  Right hip groin and laterally  Onset: 3 years ago  Duration of symptoms: persistent  Quality of symptoms: sharp  Severity: 8/10  Alleviating: activity modification  Exacerbating: activities  Previous Treatments: Previous treatments include activity modification, oral pain medication    MAGDI Jr:  PROMIS Mental: (P) 20  PROMIS Physical: (P) 14  PROMIS Total: (P) 34  UCLA Activity Scale:    MEDICAL HISTORY: No past medical history on file.    Pertinent negatives:  Patient has no history of DVT or PE. Discussed risk factors.    Medications:   No current outpatient medications on file.    Current Facility-Administered Medications:      triamcinolone (KENALOG-40) injection 40 mg, 40 mg, , , , 40 mg at 09/14/24 1116    Allergies: Patient has no known allergies.    SURGICAL HISTORY: No past surgical history on file.    HISTORY: No family history on  "file.    SOCIAL HISTORY:   Social History     Tobacco Use     Smoking status: Not on file     Smokeless tobacco: Not on file   Substance Use Topics     Alcohol use: Not on file       REVIEW OF SYSTEMS:  The comprehensive review of systems from the intake form was reviewed with the patient.  No fever, weight change or fatigue. No dry eyes. No oral ulcers, sore throat or voice change. No palpitations, syncope, angina or edema.  No chest pain, excessive sleepiness, shortness of breath or hemoptysis.   No abdominal pain, nausea, vomiting, diarrhea or heartburn.  No skin rash. No focal weakness or numbness. No bleeding or lymphadenopathy. No rhinitis or hives.     Exam:  On physical examination the patient appears the stated age, is in no acute distress, alert and oriented, affect is appropriate, and breathing is non-labored.  Vitals are documented in the EMR and have been reviewed:    Ht 1.683 m (5' 6.25\")   Wt 108.9 kg (240 lb)   BMI 38.45 kg/m    5' 6.25\"  Body mass index is 38.45 kg/m .    Rises from chair:  Gait:  Trendelenburg test:  Gains the exam table:    RIGHT hip subjective:  Abd: 30  Add: 10  Flexion: 90  IRF: -20  ERF: 30  Impingement test: positive  Tenderness to palpation: greater trochanter    LEFT hip subjective:  Abd: 30  Add: 10  Flexion: 100  IRF: 15  ERF: 45  Impingement test: negative  Tenderness to palpation, negative    Distal the circulatory, motor, and sensation exam is intact with 5/5 EHL, gastroc-soleus, and tibialis anterior.  Sensation to light touch is intact.  Dorsalis pedis and posterior tibialis pulses are palpable.  There are no sores on the feet, no bruising, and no lymphedema.    Imaging: XR of pelvis demonstrates DJD of right hip with joint space loss, peripheral osteophyte formation, subchondral cysts    Rocky Cadet MD  Orthopedic Surgery, PGY-3      I have personally examined this patient and have reviewed the clinical presentation and progress note with the resident. I agree " with the treatment plan as outlined. The plan was formulated with the resident on the day of the resident's dictation.    We discussed that given the level of symptoms and radiographic changes that further non-operative management in the form injection and/or physical therapist directed exercises is not mandatory. We spent twenty minutes discussing total hip arthroplasty.  We discussed the implants, the procedure, the risks and benefits, and the post-operative course.  We discussed blood clots, blood clots to the lungs, injury to blood vessels and nerves, dislocation, infection, and leg length difference.  We discussed that as part of the routine part of surgical care a qualified assist may finish closing the wound after I have left the room. We reivewed templated radiographs as part of teaching. All the patients questions were answered to the best of my ability.      Again, thank you for allowing me to participate in the care of your patient.        Sincerely,        Aaron Soto MD

## 2024-10-15 NOTE — NURSING NOTE
"Jaky Brooke's chief complaint for this visit includes:  Chief Complaint   Patient presents with    Consult     R hip consult        Referring Provider:  Referred Self, MD  No address on file    Ht 1.683 m (5' 6.25\")   Wt 108.9 kg (240 lb)   BMI 38.45 kg/m    Moderate Pain (5)   Global Mental Health Score: (P) 20  Global Physical Health Score: (P) 14  PROMIS TOTAL - SUBSCORES: (P) 34  UCLA: 6  HOOS Jr. 70.43     Pain increases with: extended walking  Previous surgeries: NO  Previous injections within last 6 months: YES - Date: 9/14/24  Treatments done: Injections - helpful but does not remove completely, PT- now on HEP, Ibuprofen,   Imaging completed: XR   Pain: 10/10 After higher levels of activity   Concerns: Is surgery the next step    Cabrera Jean, ATC            "

## 2024-10-15 NOTE — PROGRESS NOTES
Assessment: Right hip OA, currently being managed with conservative measures such as PT, IA CSI, NSAIDs.    Plan: Right MICHAEL      Chief Complaint: Consult (R hip consult )      Physician:  Referred Self    HPI: Jaky Brooke is a 64 year old female who presents today for evaluation of chronic right hip pain.  States that started about 3 years ago without any precipitating event.  Feels mostly laterally but does have some groin and some lower back pain.  Does have some pain that radiates down past the knee on the right side.  Has done physical therapy, has been to the chiropractor, takes ibuprofen daily, had an intra-articular right hip injection 1 month ago all of these with some relief. Symptoms are limiting her daily activities. She is interested in surgical management if this is recommended.     Location of symptoms:  Right hip groin and laterally  Onset: 3 years ago  Duration of symptoms: persistent  Quality of symptoms: sharp  Severity: 8/10  Alleviating: activity modification  Exacerbating: activities  Previous Treatments: Previous treatments include activity modification, oral pain medication    HOOS Jr:  PROMIS Mental: (P) 20  PROMIS Physical: (P) 14  PROMIS Total: (P) 34  UCLA Activity Scale:    MEDICAL HISTORY: No past medical history on file.    Pertinent negatives:  Patient has no history of DVT or PE. Discussed risk factors.    Medications:   No current outpatient medications on file.    Current Facility-Administered Medications:     triamcinolone (KENALOG-40) injection 40 mg, 40 mg, , , , 40 mg at 09/14/24 1116    Allergies: Patient has no known allergies.    SURGICAL HISTORY: No past surgical history on file.    HISTORY: No family history on file.    SOCIAL HISTORY:   Social History     Tobacco Use    Smoking status: Not on file    Smokeless tobacco: Not on file   Substance Use Topics    Alcohol use: Not on file       REVIEW OF SYSTEMS:  The comprehensive review of systems from the intake form was  "reviewed with the patient.  No fever, weight change or fatigue. No dry eyes. No oral ulcers, sore throat or voice change. No palpitations, syncope, angina or edema.  No chest pain, excessive sleepiness, shortness of breath or hemoptysis.   No abdominal pain, nausea, vomiting, diarrhea or heartburn.  No skin rash. No focal weakness or numbness. No bleeding or lymphadenopathy. No rhinitis or hives.     Exam:  On physical examination the patient appears the stated age, is in no acute distress, alert and oriented, affect is appropriate, and breathing is non-labored.  Vitals are documented in the EMR and have been reviewed:    Ht 1.683 m (5' 6.25\")   Wt 108.9 kg (240 lb)   BMI 38.45 kg/m    5' 6.25\"  Body mass index is 38.45 kg/m .    Rises from chair:  Gait:  Trendelenburg test:  Gains the exam table:    RIGHT hip subjective:  Abd: 30  Add: 10  Flexion: 90  IRF: -20  ERF: 30  Impingement test: positive  Tenderness to palpation: greater trochanter    LEFT hip subjective:  Abd: 30  Add: 10  Flexion: 100  IRF: 15  ERF: 45  Impingement test: negative  Tenderness to palpation, negative    Distal the circulatory, motor, and sensation exam is intact with 5/5 EHL, gastroc-soleus, and tibialis anterior.  Sensation to light touch is intact.  Dorsalis pedis and posterior tibialis pulses are palpable.  There are no sores on the feet, no bruising, and no lymphedema.    Imaging: XR of pelvis demonstrates DJD of right hip with joint space loss, peripheral osteophyte formation, subchondral cysts    Rocky Cadet MD  Orthopedic Surgery, PGY-3    "

## 2024-10-16 NOTE — TELEPHONE ENCOUNTER
Procedure: RTHA  Facility: Any location for SDD.   Length: 120 minutes  Anesthesia: Choice  Post-op appointments needed: 2 weeks provider only, 6 weeks with provider only.  Surgery packet/instructions given to patient?  Yes  but not reviewed    Norma Garcia RN

## 2024-10-22 NOTE — PROGRESS NOTES
I have personally examined this patient and have reviewed the clinical presentation and progress note with the resident. I agree with the treatment plan as outlined. The plan was formulated with the resident on the day of the resident's dictation.    We discussed that given the level of symptoms and radiographic changes that further non-operative management in the form injection and/or physical therapist directed exercises is not mandatory. We spent twenty minutes discussing total hip arthroplasty.  We discussed the implants, the procedure, the risks and benefits, and the post-operative course.  We discussed blood clots, blood clots to the lungs, injury to blood vessels and nerves, dislocation, infection, and leg length difference.  We discussed that as part of the routine part of surgical care a qualified assist may finish closing the wound after I have left the room. We reivewed templated radiographs as part of teaching. All the patients questions were answered to the best of my ability.

## 2024-10-23 NOTE — TELEPHONE ENCOUNTER
Message left for the patient to call back to get surgery scheduled with Dr. Soto.     Tajna Conner  Surgery Scheduling Coordinator  Ph: [phone]

## 2024-11-03 ENCOUNTER — HEALTH MAINTENANCE LETTER (OUTPATIENT)
Age: 64
End: 2024-11-03

## 2024-11-05 NOTE — TELEPHONE ENCOUNTER
2nd message left for the patient to call back to get surgery scheduled.     Tanja Conner  Surgery Scheduling Coordinator  Ph: [phone]

## 2024-11-25 NOTE — TELEPHONE ENCOUNTER
3rd message left for the patient to call back to get surgery scheduled with Dr. Soto.     Tanja Conner  Surgery Scheduling Coordinator  Ph: [phone]